# Patient Record
Sex: MALE | Race: WHITE | Employment: OTHER | URBAN - METROPOLITAN AREA
[De-identification: names, ages, dates, MRNs, and addresses within clinical notes are randomized per-mention and may not be internally consistent; named-entity substitution may affect disease eponyms.]

---

## 2023-04-07 DIAGNOSIS — R97.20 ELEVATED PROSTATE SPECIFIC ANTIGEN (PSA): Primary | ICD-10-CM

## 2023-04-12 ENCOUNTER — NURSE ONLY (OUTPATIENT)
Dept: UROLOGY | Age: 83
End: 2023-04-12

## 2023-04-12 DIAGNOSIS — R97.20 ELEVATED PROSTATE SPECIFIC ANTIGEN (PSA): ICD-10-CM

## 2023-04-12 LAB — PSA SERPL-MCNC: 5.3 NG/ML

## 2023-05-30 RX ORDER — FINASTERIDE 5 MG/1
TABLET, FILM COATED ORAL
Qty: 90 TABLET | Refills: 3 | Status: SHIPPED | OUTPATIENT
Start: 2023-05-30

## 2023-09-20 ENCOUNTER — OFFICE VISIT (OUTPATIENT)
Dept: UROLOGY | Age: 83
End: 2023-09-20
Payer: MEDICARE

## 2023-09-20 DIAGNOSIS — N40.1 BENIGN PROSTATIC HYPERPLASIA WITH URINARY OBSTRUCTION: ICD-10-CM

## 2023-09-20 DIAGNOSIS — R97.20 ELEVATED PROSTATE SPECIFIC ANTIGEN (PSA): Primary | ICD-10-CM

## 2023-09-20 DIAGNOSIS — N13.8 BENIGN PROSTATIC HYPERPLASIA WITH URINARY OBSTRUCTION: ICD-10-CM

## 2023-09-20 LAB
BILIRUBIN, URINE, POC: NEGATIVE
BLOOD URINE, POC: NEGATIVE
GLUCOSE URINE, POC: NEGATIVE
KETONES, URINE, POC: 15
LEUKOCYTE ESTERASE, URINE, POC: NEGATIVE
NITRITE, URINE, POC: NEGATIVE
PH, URINE, POC: 5.5 (ref 4.6–8)
PROTEIN,URINE, POC: NEGATIVE
SPECIFIC GRAVITY, URINE, POC: 1.02 (ref 1–1.03)
URINALYSIS CLARITY, POC: NORMAL
URINALYSIS COLOR, POC: NORMAL
UROBILINOGEN, POC: NORMAL

## 2023-09-20 PROCEDURE — 81003 URINALYSIS AUTO W/O SCOPE: CPT | Performed by: UROLOGY

## 2023-09-20 PROCEDURE — 1036F TOBACCO NON-USER: CPT | Performed by: UROLOGY

## 2023-09-20 PROCEDURE — G8427 DOCREV CUR MEDS BY ELIG CLIN: HCPCS | Performed by: UROLOGY

## 2023-09-20 PROCEDURE — 99214 OFFICE O/P EST MOD 30 MIN: CPT | Performed by: UROLOGY

## 2023-09-20 PROCEDURE — 1123F ACP DISCUSS/DSCN MKR DOCD: CPT | Performed by: UROLOGY

## 2023-09-20 PROCEDURE — G8421 BMI NOT CALCULATED: HCPCS | Performed by: UROLOGY

## 2023-09-20 RX ORDER — FINASTERIDE 5 MG/1
5 TABLET, FILM COATED ORAL DAILY
Qty: 90 TABLET | Refills: 3 | Status: SHIPPED | OUTPATIENT
Start: 2023-09-20

## 2023-09-20 ASSESSMENT — ENCOUNTER SYMPTOMS: BACK PAIN: 0

## 2023-09-20 NOTE — PROGRESS NOTES
[facility]  Mariposa Samaniego Ji [address] [facility], [address] [place] [address]  [address]  : 1940    Chief Complaint   Patient presents with    Follow-up     yearly          HPI     Almas Stafford is a 81 y.o. male previously followed by Dr. Marnie De La Torre secondary to BPH, elevated psa, and episodes of gross hematuria. He is s/p 4-5 negative prostate biopsies in the past.  Most recent was  secondary to psa of 9.04. Gland was 105 grams! Pt. states psa has been as high as 17. He is currently taking finasteride daily since 2010. He stopped 4mg cardura in  secondary to low BP and did NOT see any worsening in urination. In regards to hematuria, pt. underwent thorough workup  including negative CT and cystoscopy that revealed regrowth within TURP defect and friable prostatic vessels. TURP was in approx. '99. He has had no more gross hematuria since beginning finasteride. He is on coumadin. He has had a mild lower abdominal \"ache\" for a month. It feels like the beginning of colitis (that he has had before). He has also had some issue with opposing diarrhea/constipation over this same time period. This led to CT scan 10/25/21 revealing known severe prostatomegaly.       PSA 3/08 8.27, 10/08 9.64, 11/10 4.95,  5.36,  3.86,  9.4,  4.4,  4.2,  5.4,  4.3,  3.9, 3/21 3.9,  5.3                Past Medical History:   Diagnosis Date    Acute prostatitis 2014    Atrial fibrillation ([address]) 2014    Calculus of kidney 2014    Calculus of ureter 2014    Diabetes ([address]) 2014    Elevated prostate specific antigen (PSA) 2014    Erectile dysfunction 2014    Glaucoma 2014    Gross hematuria 2014    Hematuria 2014    HTN (hypertension) 2014    Hypertrophy of prostate with urinary obstruction and other lower urinary tract symptoms (LUTS) 2014    Hypertrophy of prostate without urinary

## 2024-03-08 ENCOUNTER — TELEPHONE (OUTPATIENT)
Dept: UROLOGY | Age: 84
End: 2024-03-08

## 2024-03-08 NOTE — TELEPHONE ENCOUNTER
Continue present management and follow-up with Dr. Nielson   Patient called, he says he has noticed that his L testicle has been swollen for about a week, he says the size of a tangerine.  He feels no lumps.  He has an appt with you on 4/11, wants to know if he should try to be seen sooner. Please advise.

## 2024-03-12 ENCOUNTER — TELEPHONE (OUTPATIENT)
Dept: UROLOGY | Age: 84
End: 2024-03-12

## 2024-03-12 NOTE — TELEPHONE ENCOUNTER
Pt returning call from yesterday. C/o \"cystitis on the abdomen\". Per Dr. Sorensen, as long as pain does not get worse, keep appts scheduled in April. Pt aware.

## 2024-04-11 ENCOUNTER — NURSE ONLY (OUTPATIENT)
Dept: UROLOGY | Age: 84
End: 2024-04-11

## 2024-04-11 DIAGNOSIS — R97.20 ELEVATED PROSTATE SPECIFIC ANTIGEN (PSA): ICD-10-CM

## 2024-04-11 LAB — PSA SERPL-MCNC: 4.5 NG/ML

## 2024-04-17 ASSESSMENT — ENCOUNTER SYMPTOMS: BACK PAIN: 0

## 2024-04-17 NOTE — PROGRESS NOTES
[facility]  [address] [place] [id number]  [phone]          Gagan Kinney  : 1940    Chief Complaint   Patient presents with    Follow-up    Groin Swelling     Left          HPI     Gagan Kinnye is a 84 y.o. malepreviously followed by Dr. Humphrey secondary to BPH, elevated psa, and episodes of gross hematuria.  He is s/p 4-5 negative prostate biopsies in the past.  Most recent was  secondary to psa of 9.04.  Gland was 105 grams!  Pt. states psa has been as high as 17.   He is currently taking finasteride daily since 2010.  He stopped 4mg cardura in  secondary to low BP and did NOT see any worsening in urination.      In regards to hematuria, pt. underwent thorough workup  including negative CT and cystoscopy that revealed regrowth within TURP defect and friable prostatic vessels.  TURP was in approx. .        He has had no more gross hematuria since beginning finasteride.  He is on coumadin.     He has had a mild lower abdominal \"ache\" for a month.  It feels like the beginning of colitis (that he has had before).  He has also had some issue with opposing diarrhea/constipation over this same time period.  This led to CT scan 10/25/21 revealing known severe prostatomegaly.    In early , he noticed some swelling close to L testis.  NOT symptomatic.       PSA 3/08 8.27, 10/08 9.64, 11/10 4.95,  5.36,  3.86,  9.4,  4.4,  4.2,  5.4,  4.3,  3.9, 3/21 3.9,  5.3,  4.5          Past Medical History:   Diagnosis Date    Acute prostatitis 2014    Atrial fibrillation (HCC) 2014    Calculus of kidney 2014    Calculus of ureter 2014    Diabetes (HCC) 2014    Elevated prostate specific antigen (PSA) 2014    Erectile dysfunction 2014    Glaucoma 2014    Gross hematuria 2014    Hematuria 2014    HTN (hypertension) 2014    Hypertrophy of prostate with urinary obstruction and

## 2024-04-18 ENCOUNTER — OFFICE VISIT (OUTPATIENT)
Dept: UROLOGY | Age: 84
End: 2024-04-18
Payer: MEDICARE

## 2024-04-18 DIAGNOSIS — N13.8 BENIGN PROSTATIC HYPERPLASIA WITH URINARY OBSTRUCTION: ICD-10-CM

## 2024-04-18 DIAGNOSIS — N40.1 BENIGN PROSTATIC HYPERPLASIA WITH URINARY OBSTRUCTION: ICD-10-CM

## 2024-04-18 DIAGNOSIS — N43.3 LEFT HYDROCELE: ICD-10-CM

## 2024-04-18 DIAGNOSIS — R97.20 ELEVATED PROSTATE SPECIFIC ANTIGEN (PSA): Primary | ICD-10-CM

## 2024-04-18 LAB
BILIRUBIN, URINE, POC: NORMAL
BLOOD URINE, POC: NEGATIVE
GLUCOSE URINE, POC: NEGATIVE
KETONES, URINE, POC: 15
LEUKOCYTE ESTERASE, URINE, POC: NEGATIVE
NITRITE, URINE, POC: NEGATIVE
PH, URINE, POC: 5.5 (ref 4.6–8)
PROTEIN,URINE, POC: NEGATIVE
SPECIFIC GRAVITY, URINE, POC: 1.02 (ref 1–1.03)
URINALYSIS CLARITY, POC: NORMAL
URINALYSIS COLOR, POC: NORMAL
UROBILINOGEN, POC: NORMAL

## 2024-04-18 PROCEDURE — G8421 BMI NOT CALCULATED: HCPCS | Performed by: UROLOGY

## 2024-04-18 PROCEDURE — 81003 URINALYSIS AUTO W/O SCOPE: CPT | Performed by: UROLOGY

## 2024-04-18 PROCEDURE — 1036F TOBACCO NON-USER: CPT | Performed by: UROLOGY

## 2024-04-18 PROCEDURE — 99214 OFFICE O/P EST MOD 30 MIN: CPT | Performed by: UROLOGY

## 2024-04-18 PROCEDURE — G8427 DOCREV CUR MEDS BY ELIG CLIN: HCPCS | Performed by: UROLOGY

## 2024-04-18 PROCEDURE — 1123F ACP DISCUSS/DSCN MKR DOCD: CPT | Performed by: UROLOGY

## 2024-04-18 RX ORDER — FINASTERIDE 5 MG/1
5 TABLET, FILM COATED ORAL DAILY
Qty: 90 TABLET | Refills: 3 | Status: SHIPPED | OUTPATIENT
Start: 2024-04-18

## 2025-04-14 ENCOUNTER — LAB (OUTPATIENT)
Dept: UROLOGY | Age: 85
End: 2025-04-14

## 2025-04-14 DIAGNOSIS — R97.20 ELEVATED PROSTATE SPECIFIC ANTIGEN (PSA): ICD-10-CM

## 2025-04-14 LAB — PSA SERPL-MCNC: 3.7 NG/ML (ref 0–4)

## 2025-04-21 ENCOUNTER — OFFICE VISIT (OUTPATIENT)
Dept: UROLOGY | Age: 85
End: 2025-04-21
Payer: MEDICARE

## 2025-04-21 DIAGNOSIS — R97.20 ELEVATED PROSTATE SPECIFIC ANTIGEN (PSA): Primary | ICD-10-CM

## 2025-04-21 DIAGNOSIS — N43.3 LEFT HYDROCELE: ICD-10-CM

## 2025-04-21 DIAGNOSIS — N40.1 BENIGN PROSTATIC HYPERPLASIA WITH URINARY OBSTRUCTION: ICD-10-CM

## 2025-04-21 DIAGNOSIS — N13.8 BENIGN PROSTATIC HYPERPLASIA WITH URINARY OBSTRUCTION: ICD-10-CM

## 2025-04-21 LAB
BILIRUBIN, URINE, POC: NEGATIVE
BLOOD URINE, POC: NEGATIVE
GLUCOSE URINE, POC: NEGATIVE MG/DL
KETONES, URINE, POC: NEGATIVE MG/DL
LEUKOCYTE ESTERASE, URINE, POC: NEGATIVE
NITRITE, URINE, POC: NEGATIVE
PH, URINE, POC: 5.5 (ref 4.6–8)
PROTEIN,URINE, POC: NORMAL MG/DL
SPECIFIC GRAVITY, URINE, POC: 1.02 (ref 1–1.03)
URINALYSIS CLARITY, POC: NORMAL
URINALYSIS COLOR, POC: NORMAL
UROBILINOGEN, POC: NORMAL MG/DL

## 2025-04-21 PROCEDURE — 1160F RVW MEDS BY RX/DR IN RCRD: CPT | Performed by: UROLOGY

## 2025-04-21 PROCEDURE — 1123F ACP DISCUSS/DSCN MKR DOCD: CPT | Performed by: UROLOGY

## 2025-04-21 PROCEDURE — G8421 BMI NOT CALCULATED: HCPCS | Performed by: UROLOGY

## 2025-04-21 PROCEDURE — 1159F MED LIST DOCD IN RCRD: CPT | Performed by: UROLOGY

## 2025-04-21 PROCEDURE — 1036F TOBACCO NON-USER: CPT | Performed by: UROLOGY

## 2025-04-21 PROCEDURE — 81003 URINALYSIS AUTO W/O SCOPE: CPT | Performed by: UROLOGY

## 2025-04-21 PROCEDURE — 99213 OFFICE O/P EST LOW 20 MIN: CPT | Performed by: UROLOGY

## 2025-04-21 PROCEDURE — G8427 DOCREV CUR MEDS BY ELIG CLIN: HCPCS | Performed by: UROLOGY

## 2025-04-21 RX ORDER — FINASTERIDE 5 MG/1
5 TABLET, FILM COATED ORAL DAILY
Qty: 90 TABLET | Refills: 3 | Status: SHIPPED | OUTPATIENT
Start: 2025-04-21

## 2025-04-21 ASSESSMENT — ENCOUNTER SYMPTOMS: BACK PAIN: 0

## 2025-04-21 NOTE — PROGRESS NOTES
[facility]  [address] [place] [id number]  [phone]          Gagan Kinney  : 1940    Chief Complaint   Patient presents with    Follow-up     yearly          HPI     Gagan Kinney is a 85 y.o. malepreviously followed by Dr. Humphrey secondary to BPH, elevated psa, and episodes of gross hematuria.  He is s/p 4-5 negative prostate biopsies in the past.  Most recent was  secondary to psa of 9.04.  Gland was 105 grams!  Pt. states psa has been as high as 17.   He is currently taking finasteride daily since 2010.  He stopped 4mg cardura in  secondary to low BP and did NOT see any worsening in urination.      In regards to hematuria, pt. underwent thorough workup  including negative CT and cystoscopy that revealed regrowth within TURP defect and friable prostatic vessels.  TURP was in approx. '99.        He has had no more gross hematuria since beginning finasteride.  He is on coumadin.     He has had a mild lower abdominal \"ache\" for a month.  It feels like the beginning of colitis (that he has had before).  He has also had some issue with opposing diarrhea/constipation over this same time period.  This led to CT scan 10/25/21 revealing known severe prostatomegaly.     He has an asymptomatic L hydrocele.       PSA 3/08 8.27, 10/08 9.64, 11/10 4.95,  5.36,  3.86,  9.4,  4.4,  4.2,  5.4,  4.3,  3.9, 3/21 3.9,  5.3,  4.5,  3.7          Past Medical History:   Diagnosis Date    Acute prostatitis 2014    Atrial fibrillation (HCC) 2014    Calculus of kidney 2014    Calculus of ureter 2014    Diabetes (HCC) 2014    Elevated prostate specific antigen (PSA) 2014    Erectile dysfunction 2014    Glaucoma 2014    Gross hematuria 2014    Hematuria 2014    HTN (hypertension) 2014    Hypertrophy of prostate with urinary obstruction and other lower urinary tract symptoms (LUTS)